# Patient Record
Sex: FEMALE | HISPANIC OR LATINO | Employment: UNEMPLOYED | URBAN - METROPOLITAN AREA
[De-identification: names, ages, dates, MRNs, and addresses within clinical notes are randomized per-mention and may not be internally consistent; named-entity substitution may affect disease eponyms.]

---

## 2019-12-27 ENCOUNTER — HOSPITAL ENCOUNTER (EMERGENCY)
Facility: MEDICAL CENTER | Age: 44
End: 2019-12-27

## 2023-01-04 ENCOUNTER — OFFICE VISIT (OUTPATIENT)
Dept: MEDICAL GROUP | Facility: CLINIC | Age: 48
End: 2023-01-04
Payer: MEDICAID

## 2023-01-04 VITALS — WEIGHT: 163 LBS | HEIGHT: 64 IN | BODY MASS INDEX: 27.83 KG/M2

## 2023-01-04 DIAGNOSIS — Z13.79 GENETIC SCREENING: ICD-10-CM

## 2023-01-04 DIAGNOSIS — Z76.89 ENCOUNTER TO ESTABLISH CARE: ICD-10-CM

## 2023-01-04 DIAGNOSIS — Z83.3 FAMILY HISTORY OF DIABETES MELLITUS: ICD-10-CM

## 2023-01-04 DIAGNOSIS — N28.9 KIDNEY DISEASE: ICD-10-CM

## 2023-01-04 PROCEDURE — 99203 OFFICE O/P NEW LOW 30 MIN: CPT | Performed by: STUDENT IN AN ORGANIZED HEALTH CARE EDUCATION/TRAINING PROGRAM

## 2023-01-04 ASSESSMENT — ENCOUNTER SYMPTOMS
ABDOMINAL PAIN: 0
SHORTNESS OF BREATH: 0
SENSORY CHANGE: 0
HEADACHES: 0
NAUSEA: 0
COUGH: 0
BLURRED VISION: 0
BACK PAIN: 0
MYALGIAS: 0
SORE THROAT: 0
FOCAL WEAKNESS: 0
SPEECH CHANGE: 0
FEVER: 0
VOMITING: 0
CHILLS: 0

## 2023-01-04 NOTE — PROGRESS NOTES
Subjective:     CC:  Establish care    Diagnoses of Encounter to establish care, Family history of diabetes mellitus, BMI 27.0-27.9,adult, Genetic screening, and Kidney disease were pertinent to this visit.    HISTORY OF THE PRESENT ILLNESS: Patient is a 47 y.o. female. This pleasant patient is here today to establish care.     Problem   Encounter to Establish Care    Patient is a pleasant 47-year-old Amharic-speaking female who presents to establish care.  She has not seen a healthcare provider for about 5 years.  She denies any known chronic medical problems and has no complaints or concerns today.       Family History of Diabetes Mellitus    Family history of diabetes in father and sister.  Patient has not recently been screened for diabetes.     Bmi 27.0-27.9,Adult   Genetic Screening    Eligible for [facility] study.     Kidney Disease (Resolved)    H/o kidney infection       Labor and Delivery Indication for Care Or Intervention (Resolved)   Encounter for Supervision of Other Normal Pregnancy (Resolved)    Diagnois update 10/1/2016     [place] (Advanced Maternal Age) Multigravida 35+ (Resolved)     OB/GYN History:  Menses: regular, monthly, no concerns  OB history: 6 vaginal deliveries, denies complications during pregnancies or deliveries  Last Pap smear: Uncertain, several years ago. Denies abnormal pap smears in past    Social History:  Denies Tobacco, ETOH, or drug use  Lives with  and four of their children.  Not currently sexually active    ROS:   Review of Systems   Constitutional:  Negative for chills and fever.   HENT:  Negative for congestion and sore throat.    Eyes:  Negative for blurred vision.   Respiratory:  Negative for cough and shortness of breath.    Cardiovascular:  Negative for chest pain and leg swelling.   Gastrointestinal:  Negative for abdominal pain, nausea and vomiting.   Musculoskeletal:  Negative for back pain, joint pain and myalgias.   Skin:  Negative for rash.   Neurological:   "Negative for sensory change, speech change, focal weakness and headaches.   Endo/Heme/Allergies:  Positive for environmental allergies.       Objective:       Exam: Ht 1.626 m (5' 4\")   Wt 73.9 kg (163 lb)  Body mass index is 27.98 kg/m².    Physical Exam  Vitals reviewed.   Constitutional:       General: She is not in acute distress.     Appearance: Normal appearance.   HENT:      Head: Normocephalic and atraumatic.      Right Ear: External ear normal.      Left Ear: External ear normal.      Nose: Nose normal. No congestion.      Mouth/Throat:      Mouth: Mucous membranes are moist.      Pharynx: Oropharynx is clear. No oropharyngeal exudate.   Eyes:      Conjunctiva/sclera: Conjunctivae normal.      Pupils: Pupils are equal, round, and reactive to light.   Cardiovascular:      Rate and Rhythm: Normal rate and regular rhythm.      Pulses: Normal pulses.      Heart sounds: Normal heart sounds. No murmur heard.  Pulmonary:      Effort: Pulmonary effort is normal. No respiratory distress.      Breath sounds: Normal breath sounds. No stridor. No wheezing, rhonchi or rales.   Musculoskeletal:         General: No swelling or deformity.      Cervical back: Neck supple. No tenderness.   Lymphadenopathy:      Cervical: No cervical adenopathy.   Skin:     General: Skin is warm and dry.      Capillary Refill: Capillary refill takes less than 2 seconds.      Findings: No rash.   Neurological:      General: No focal deficit present.      Mental Status: She is alert.      Motor: No weakness.      Gait: Gait normal.   Psychiatric:         Mood and Affect: Mood normal.         Behavior: Behavior normal.         Thought Content: Thought content normal.         Judgment: Judgment normal.           Assessment & Plan:   47 y.o. female with the following -    Problem List Items Addressed This Visit       Encounter to establish care     Follow-up for Pap smear and well woman exam at next available appointment.         Family history " of diabetes mellitus     Hemoglobin A1c ordered today         Relevant Orders    HEMOGLOBIN A1C    BMI 27.0-27.9,adult     Hemoglobin A1c, CMP, and lipid panel ordered today.         Relevant Orders    Comp Metabolic Panel    HEMOGLOBIN A1C    Lipid Profile    Genetic screening     Ordered CBC and CMP and referred to [facility] for enrollment.         Relevant Orders    CBC WITHOUT DIFFERENTIAL    RESOLVED: Kidney disease    Relevant Orders    Referral to Genetic Research Studies           No follow-ups on file.      Schedule follow up for WWE at next available appointment

## 2024-04-30 ENCOUNTER — HOSPITAL ENCOUNTER (OUTPATIENT)
Facility: MEDICAL CENTER | Age: 49
End: 2024-04-30
Attending: STUDENT IN AN ORGANIZED HEALTH CARE EDUCATION/TRAINING PROGRAM
Payer: MEDICAID

## 2024-04-30 ENCOUNTER — OFFICE VISIT (OUTPATIENT)
Dept: MEDICAL GROUP | Facility: CLINIC | Age: 49
End: 2024-04-30
Payer: MEDICAID

## 2024-04-30 VITALS
OXYGEN SATURATION: 94 % | WEIGHT: 164 LBS | DIASTOLIC BLOOD PRESSURE: 80 MMHG | BODY MASS INDEX: 28 KG/M2 | HEART RATE: 77 BPM | RESPIRATION RATE: 17 BRPM | HEIGHT: 64 IN | SYSTOLIC BLOOD PRESSURE: 119 MMHG

## 2024-04-30 DIAGNOSIS — Z12.12 SCREENING FOR COLORECTAL CANCER: ICD-10-CM

## 2024-04-30 DIAGNOSIS — R00.2 PALPITATIONS: ICD-10-CM

## 2024-04-30 DIAGNOSIS — Z12.4 PAP SMEAR FOR CERVICAL CANCER SCREENING: ICD-10-CM

## 2024-04-30 DIAGNOSIS — Z12.11 SCREENING FOR COLORECTAL CANCER: ICD-10-CM

## 2024-04-30 DIAGNOSIS — E66.9 CLASS 2 OBESITY: ICD-10-CM

## 2024-04-30 DIAGNOSIS — Z12.31 ENCOUNTER FOR SCREENING MAMMOGRAM FOR BREAST CANCER: ICD-10-CM

## 2024-04-30 PROBLEM — E66.812 CLASS 2 OBESITY: Status: ACTIVE | Noted: 2024-04-30

## 2024-04-30 NOTE — PROGRESS NOTES
"Subjective:     CC: Establish care, Pap smear    HPI:   Katarzyna presents today with     Problem   Encounter for Screening Mammogram for Breast Cancer    The patient has never undergone a mammogram     Screening for Colorectal Cancer    The patient has never undergone a colonoscopy     Class 2 Obesity    Patient has a BMI of 28     Palpitations    Approximately once a month, the patient will have palpitations.  She cannot identify any sources contributing to her sensation.  She says that they will last for 10 to 15 minutes and resolve without intervention.     Pap Smear for Cervical Cancer Screening    Patient has not had a Pap smear in approximately 8 years.         No current [facility]-ordered outpatient medications on file.     No current [facility]-ordered facility-administered medications on file.       ROS negative unless stated in HPI.    Objective:     Exam:  /80 (BP Location: Right arm, Patient Position: Sitting, BP Cuff Size: Adult)   Pulse 77   Resp 17   Ht 1.626 m (5' 4\")   Wt 74.4 kg (164 lb)   SpO2 94%   BMI 28.15 kg/m²  Body mass index is 28.15 kg/m².    Physical Exam  Constitutional:       Appearance: Normal appearance.   Pulmonary:      Effort: Pulmonary effort is normal. No respiratory distress.      Breath sounds: Normal breath sounds.   Genitourinary:     General: Normal vulva.      Vagina: Normal.      Cervix: Normal.      Uterus: Normal.    Neurological:      General: No focal deficit present.      Mental Status: She is alert and oriented to person, place, and time.   Psychiatric:         Behavior: Behavior normal.         Labs:   Results for orders placed or performed during the hospital encounter of 10/04/12   CBC WITH DIFFERENTIAL   Result Value Ref Range    WBC 8.6 4.8 - 10.8 K/uL    RBC 3.65 (L) 4.20 - 5.40 M/uL    Hemoglobin 11.1 (L) 12.0 - 16.0 g/dL    Hematocrit 32.7 (L) 37.0 - 47.0 %    MCV 89.5 77.0 - 97.0 fL    MCH 30.3 27.0 - 33.0 pg    MCHC 33.8 30.0 - 35.0 g/dL    RDW 16.8 (H) " 12.0 - 16.2 %    Platelet Count 291 164 - 446 K/uL    MPV 7.9 6.7 - 10.4 fL    Neutrophils-Polys 73.0 (H) 44.0 - 72.0 %    Lymphocytes 18.3 (L) 22.0 - 41.0 %    Monocytes 7.1 1.0 - 9.0 %    Eosinophils 1.3 0.0 - 6.0 %    Basophils 0.3 0.0 - 2.0 %    Neutrophils (Absolute) 6.3 1.8 - 7.7 K/uL    Lymphs (Absolute) 1.6 1.0 - 4.8 K/uL   HOLD BB SPECIMEN (NOT TESTED)   Result Value Ref Range    Holding Tube - Bb DONE    RBC MORPHOLOGY   Result Value Ref Range    RBC Morphology Normal    CBC WITHOUT DIFFERENTIAL   Result Value Ref Range    WBC 10.7 4.8 - 10.8 K/uL    RBC 3.20 (L) 4.20 - 5.40 M/uL    Hemoglobin 9.7 (L) 12.0 - 16.0 g/dL    Hematocrit 29.0 (L) 37.0 - 47.0 %    MCV 90.6 77.0 - 97.0 fL    MCH 30.3 27.0 - 33.0 pg    MCHC 33.4 30.0 - 35.0 g/dL    RDW 16.4 (H) 12.0 - 16.2 %    Platelet Count 249 164 - 446 K/uL    MPV 7.9 6.7 - 10.4 fL       Assessment & Plan:     48 y.o. female with the following -     Palpitations  CBC, TSH with reflex T4, remote cardiac monitor (Zio patch) ordered.  Will review upon return.    Screening for colorectal cancer  Colonoscopy ordered    Pap smear for cervical cancer screening  Pap smear completed today with HPV cotesting    Encounter for screening mammogram for breast cancer  Mammogram ordered    Class 2 obesity  CBC, CMP, A1c, lipid profile, TSH with reflex T4 ordered.  Will review upon return.     Return if symptoms worsen or fail to improve.

## 2024-05-01 ENCOUNTER — HOSPITAL ENCOUNTER (OUTPATIENT)
Dept: LAB | Facility: MEDICAL CENTER | Age: 49
End: 2024-05-01
Attending: STUDENT IN AN ORGANIZED HEALTH CARE EDUCATION/TRAINING PROGRAM
Payer: MEDICAID

## 2024-05-01 DIAGNOSIS — E66.9 CLASS 2 OBESITY: ICD-10-CM

## 2024-05-01 LAB
ALBUMIN SERPL BCP-MCNC: 4.1 G/DL (ref 3.2–4.9)
ALBUMIN/GLOB SERPL: 1.3 G/DL
ALP SERPL-CCNC: 101 U/L (ref 30–99)
ALT SERPL-CCNC: 23 U/L (ref 2–50)
ANION GAP SERPL CALC-SCNC: 10 MMOL/L (ref 7–16)
AST SERPL-CCNC: 20 U/L (ref 12–45)
BASOPHILS # BLD AUTO: 0.8 % (ref 0–1.8)
BASOPHILS # BLD: 0.05 K/UL (ref 0–0.12)
BILIRUB SERPL-MCNC: <0.2 MG/DL (ref 0.1–1.5)
BUN SERPL-MCNC: 17 MG/DL (ref 8–22)
CALCIUM ALBUM COR SERPL-MCNC: 9.5 MG/DL (ref 8.5–10.5)
CALCIUM SERPL-MCNC: 9.6 MG/DL (ref 8.5–10.5)
CHLORIDE SERPL-SCNC: 108 MMOL/L (ref 96–112)
CHOLEST SERPL-MCNC: 148 MG/DL (ref 100–199)
CO2 SERPL-SCNC: 24 MMOL/L (ref 20–33)
CREAT SERPL-MCNC: 0.54 MG/DL (ref 0.5–1.4)
EOSINOPHIL # BLD AUTO: 0.24 K/UL (ref 0–0.51)
EOSINOPHIL NFR BLD: 3.6 % (ref 0–6.9)
ERYTHROCYTE [DISTWIDTH] IN BLOOD BY AUTOMATED COUNT: 50.4 FL (ref 35.9–50)
EST. AVERAGE GLUCOSE BLD GHB EST-MCNC: 128 MG/DL
GFR SERPLBLD CREATININE-BSD FMLA CKD-EPI: 112 ML/MIN/1.73 M 2
GLOBULIN SER CALC-MCNC: 3.1 G/DL (ref 1.9–3.5)
GLUCOSE SERPL-MCNC: 104 MG/DL (ref 65–99)
HBA1C MFR BLD: 6.1 % (ref 4–5.6)
HCT VFR BLD AUTO: 37.3 % (ref 37–47)
HDLC SERPL-MCNC: 59 MG/DL
HGB BLD-MCNC: 12 G/DL (ref 12–16)
IMM GRANULOCYTES # BLD AUTO: 0.02 K/UL (ref 0–0.11)
IMM GRANULOCYTES NFR BLD AUTO: 0.3 % (ref 0–0.9)
LDLC SERPL CALC-MCNC: 75 MG/DL
LYMPHOCYTES # BLD AUTO: 2.43 K/UL (ref 1–4.8)
LYMPHOCYTES NFR BLD: 36.8 % (ref 22–41)
MCH RBC QN AUTO: 27.8 PG (ref 27–33)
MCHC RBC AUTO-ENTMCNC: 32.2 G/DL (ref 32.2–35.5)
MCV RBC AUTO: 86.3 FL (ref 81.4–97.8)
MONOCYTES # BLD AUTO: 0.57 K/UL (ref 0–0.85)
MONOCYTES NFR BLD AUTO: 8.6 % (ref 0–13.4)
NEUTROPHILS # BLD AUTO: 3.3 K/UL (ref 1.82–7.42)
NEUTROPHILS NFR BLD: 49.9 % (ref 44–72)
NRBC # BLD AUTO: 0 K/UL
NRBC BLD-RTO: 0 /100 WBC (ref 0–0.2)
PLATELET # BLD AUTO: 403 K/UL (ref 164–446)
PMV BLD AUTO: 9.3 FL (ref 9–12.9)
POTASSIUM SERPL-SCNC: 4.2 MMOL/L (ref 3.6–5.5)
PROT SERPL-MCNC: 7.2 G/DL (ref 6–8.2)
RBC # BLD AUTO: 4.32 M/UL (ref 4.2–5.4)
SODIUM SERPL-SCNC: 142 MMOL/L (ref 135–145)
TRIGL SERPL-MCNC: 71 MG/DL (ref 0–149)
TSH SERPL DL<=0.005 MIU/L-ACNC: 2.83 UIU/ML (ref 0.38–5.33)
WBC # BLD AUTO: 6.6 K/UL (ref 4.8–10.8)

## 2024-05-02 ENCOUNTER — HOSPITAL ENCOUNTER (OUTPATIENT)
Dept: RADIOLOGY | Facility: MEDICAL CENTER | Age: 49
End: 2024-05-02
Attending: STUDENT IN AN ORGANIZED HEALTH CARE EDUCATION/TRAINING PROGRAM
Payer: MEDICAID

## 2024-05-02 DIAGNOSIS — Z12.31 ENCOUNTER FOR SCREENING MAMMOGRAM FOR BREAST CANCER: ICD-10-CM

## 2024-05-02 LAB — AMBIGUOUS DTTM AMBI4: NORMAL

## 2024-05-06 LAB
C TRACH RRNA CVX QL NAA+PROBE: NEGATIVE
CYTOLOGIST CVX/VAG CYTO: NORMAL
CYTOLOGY CVX/VAG DOC CYTO: NORMAL
CYTOLOGY CVX/VAG DOC THIN PREP: NORMAL
HPV I/H RISK 4 DNA CVX QL PROBE+SIG AMP: NEGATIVE
N GONORRHOEA RRNA CVX QL NAA+PROBE: NEGATIVE
NOTE NL11727A: NORMAL
OTHER STN SPEC: NORMAL
STAT OF ADQ CVX/VAG CYTO-IMP: NORMAL

## 2024-05-07 DIAGNOSIS — N63.23 MASS OF LOWER OUTER QUADRANT OF LEFT BREAST: ICD-10-CM

## 2024-05-07 DIAGNOSIS — N63.20 MASS OF LEFT BREAST, UNSPECIFIED QUADRANT: ICD-10-CM

## 2024-05-08 ENCOUNTER — TELEPHONE (OUTPATIENT)
Dept: MEDICAL GROUP | Facility: CLINIC | Age: 49
End: 2024-05-08
Payer: MEDICAID

## 2024-05-08 NOTE — TELEPHONE ENCOUNTER
Phone Number Called: [phone] (home)       Call outcome: Spoke to patient regarding message below.    Message: I have called the patient, her  answer the call and I was informing him that we need to make an appointment for  her wife to go over the results of the labs per provider request, he the  informed me that he came in today to schedule and appointment and was informed that they couldn't make him an appointment. At all he is not happy with our office and will not be coming her any more as the  are rude.

## 2024-05-08 NOTE — TELEPHONE ENCOUNTER
----- Message from Daily West M.D. sent at 5/7/2024  9:02 AM PDT -----  Please have this to make an appointment to discuss the results of her labs.

## 2024-05-13 ENCOUNTER — TELEPHONE (OUTPATIENT)
Dept: MEDICAL GROUP | Facility: CLINIC | Age: 49
End: 2024-05-13
Payer: MEDICAID

## 2024-05-13 NOTE — TELEPHONE ENCOUNTER
Phone Number Called: [phone] (home)      Call outcome: Left detailed message for patient. Informed to call back with any additional questions.    Message: LM to call and schedule appointment to discuss Blood work.

## 2024-05-13 NOTE — TELEPHONE ENCOUNTER
----- Message from César Quiroz, Med Ass't sent at 5/9/2024  9:46 AM PDT -----  Danish speaking pt, would one of you mind giving her call when you have a moment. Thank you!  ----- Message -----  From: Daily West M.D.  Sent: 5/7/2024   9:02 AM PDT  To: Kell Tello Ma    Please have this to make an appointment to discuss the results of her labs.

## 2024-05-16 ENCOUNTER — HOSPITAL ENCOUNTER (OUTPATIENT)
Dept: RADIOLOGY | Facility: MEDICAL CENTER | Age: 49
End: 2024-05-16
Attending: STUDENT IN AN ORGANIZED HEALTH CARE EDUCATION/TRAINING PROGRAM
Payer: MEDICAID

## 2024-05-16 DIAGNOSIS — R92.8 ABNORMAL MAMMOGRAM: ICD-10-CM

## 2024-05-17 ENCOUNTER — HOSPITAL ENCOUNTER (OUTPATIENT)
Dept: RADIOLOGY | Facility: MEDICAL CENTER | Age: 49
End: 2024-05-17
Attending: STUDENT IN AN ORGANIZED HEALTH CARE EDUCATION/TRAINING PROGRAM
Payer: MEDICAID

## 2024-06-14 ENCOUNTER — OFFICE VISIT (OUTPATIENT)
Dept: MEDICAL GROUP | Facility: CLINIC | Age: 49
End: 2024-06-14
Payer: MEDICAID

## 2024-06-14 VITALS
HEIGHT: 60 IN | OXYGEN SATURATION: 96 % | DIASTOLIC BLOOD PRESSURE: 75 MMHG | HEART RATE: 72 BPM | RESPIRATION RATE: 16 BRPM | SYSTOLIC BLOOD PRESSURE: 121 MMHG | BODY MASS INDEX: 32.2 KG/M2 | TEMPERATURE: 97.1 F | WEIGHT: 164 LBS

## 2024-06-14 DIAGNOSIS — R73.03 PREDIABETES: ICD-10-CM

## 2024-06-14 DIAGNOSIS — Z76.89 ENCOUNTER TO ESTABLISH CARE: ICD-10-CM

## 2024-06-14 PROCEDURE — 99213 OFFICE O/P EST LOW 20 MIN: CPT | Mod: GE | Performed by: STUDENT IN AN ORGANIZED HEALTH CARE EDUCATION/TRAINING PROGRAM

## 2024-06-14 ASSESSMENT — PATIENT HEALTH QUESTIONNAIRE - PHQ9: CLINICAL INTERPRETATION OF PHQ2 SCORE: 0

## 2024-06-14 ASSESSMENT — FIBROSIS 4 INDEX: FIB4 SCORE: 0.52

## 2024-06-14 NOTE — PROGRESS NOTES
Subjective:     CC: Follow up labs    HPI:   Katarzyna presents today with    Problem   Prediabetes    6/14/2024  Patient A1c 6.1.  Patient reports that she does not eat meat.  She does not have an established exercise plan at this time.     Encounter to Establish Care    1/4/2023  Patient is a pleasant 47-year-old Setswana-speaking female who presents to establish care.  She has not seen a healthcare provider for about 5 years.  She denies any known chronic medical problems and has no complaints or concerns today.    6/14/2024  Patient requesting the results of her laboratory studies.           No current Epic-ordered outpatient medications on file.     No current Epic-ordered facility-administered medications on file.     ROS negative unless stated in HPI.    Objective:     Exam:  /75   Pulse 72   Temp 36.2 °C (97.1 °F) (Temporal)   Resp 16   Ht 1.524 m (5')   Wt 74.4 kg (164 lb)   SpO2 96%   BMI 32.03 kg/m²  Body mass index is 32.03 kg/m².    Physical Exam  Constitutional:       Appearance: Normal appearance.   Pulmonary:      Effort: Pulmonary effort is normal. No respiratory distress.   Neurological:      General: No focal deficit present.      Mental Status: She is alert and oriented to person, place, and time.   Psychiatric:         Behavior: Behavior normal.       Labs:   Results for orders placed or performed during the hospital encounter of 05/01/24   Lipid Profile   Result Value Ref Range    Cholesterol,Tot 148 100 - 199 mg/dL    Triglycerides 71 0 - 149 mg/dL    HDL 59 >=40 mg/dL    LDL 75 <100 mg/dL   TSH WITH REFLEX TO FT4   Result Value Ref Range    TSH 2.830 0.380 - 5.330 uIU/mL   HEMOGLOBIN A1C   Result Value Ref Range    Glycohemoglobin 6.1 (H) 4.0 - 5.6 %    Est Avg Glucose 128 mg/dL   Comp Metabolic Panel   Result Value Ref Range    Sodium 142 135 - 145 mmol/L    Potassium 4.2 3.6 - 5.5 mmol/L    Chloride 108 96 - 112 mmol/L    Co2 24 20 - 33 mmol/L    Anion Gap 10.0 7.0 - 16.0     Glucose 104 (H) 65 - 99 mg/dL    Bun 17 8 - 22 mg/dL    Creatinine 0.54 0.50 - 1.40 mg/dL    Calcium 9.6 8.5 - 10.5 mg/dL    Correct Calcium 9.5 8.5 - 10.5 mg/dL    AST(SGOT) 20 12 - 45 U/L    ALT(SGPT) 23 2 - 50 U/L    Alkaline Phosphatase 101 (H) 30 - 99 U/L    Total Bilirubin <0.2 0.1 - 1.5 mg/dL    Albumin 4.1 3.2 - 4.9 g/dL    Total Protein 7.2 6.0 - 8.2 g/dL    Globulin 3.1 1.9 - 3.5 g/dL    A-G Ratio 1.3 g/dL   CBC WITH DIFFERENTIAL   Result Value Ref Range    WBC 6.6 4.8 - 10.8 K/uL    RBC 4.32 4.20 - 5.40 M/uL    Hemoglobin 12.0 12.0 - 16.0 g/dL    Hematocrit 37.3 37.0 - 47.0 %    MCV 86.3 81.4 - 97.8 fL    MCH 27.8 27.0 - 33.0 pg    MCHC 32.2 32.2 - 35.5 g/dL    RDW 50.4 (H) 35.9 - 50.0 fL    Platelet Count 403 164 - 446 K/uL    MPV 9.3 9.0 - 12.9 fL    Neutrophils-Polys 49.90 44.00 - 72.00 %    Lymphocytes 36.80 22.00 - 41.00 %    Monocytes 8.60 0.00 - 13.40 %    Eosinophils 3.60 0.00 - 6.90 %    Basophils 0.80 0.00 - 1.80 %    Immature Granulocytes 0.30 0.00 - 0.90 %    Nucleated RBC 0.00 0.00 - 0.20 /100 WBC    Neutrophils (Absolute) 3.30 1.82 - 7.42 K/uL    Lymphs (Absolute) 2.43 1.00 - 4.80 K/uL    Monos (Absolute) 0.57 0.00 - 0.85 K/uL    Eos (Absolute) 0.24 0.00 - 0.51 K/uL    Baso (Absolute) 0.05 0.00 - 0.12 K/uL    Immature Granulocytes (abs) 0.02 0.00 - 0.11 K/uL    NRBC (Absolute) 0.00 K/uL   ESTIMATED GFR   Result Value Ref Range    GFR (CKD-EPI) 112 >60 mL/min/1.73 m 2       Assessment & Plan:     48 y.o. female with the following -     Prediabetes  Lifestyle modifications discussed.  Patient elected to attempt lifestyle modifications prior to starting metformin.  Patient to return in approximately 3 months time to reassess A1c.    Encounter to establish care  Labs completed 5/1/2024 reviewed and discussed with the patient.  She expressed understanding.  Patient expressed understanding of her mammogram results.  Patient expressed understanding of her pap smear results.         Return if  symptoms worsen or fail to improve.

## 2024-06-14 NOTE — ASSESSMENT & PLAN NOTE
Lifestyle modifications discussed.  Patient elected to attempt lifestyle modifications prior to starting metformin.  Patient to return in approximately 3 months time to reassess A1c.

## 2024-06-14 NOTE — ASSESSMENT & PLAN NOTE
Labs completed 5/1/2024 reviewed and discussed with the patient.  She expressed understanding.  Patient expressed understanding of her mammogram results.  Patient expressed understanding of her pap smear results.

## 2024-06-14 NOTE — PROGRESS NOTES
This patient's case was reviewed and discussed with the resident immediately after the patient visit. I agree with the resident's assessment and plan that we developed, as outlined in the note. Report electronically signed by:    Herber Heredia MD   [facility]  [place]

## 2024-08-07 ENCOUNTER — PHARMACY VISIT (OUTPATIENT)
Dept: PHARMACY | Facility: MEDICAL CENTER | Age: 49
End: 2024-08-07
Payer: COMMERCIAL

## 2024-08-07 PROCEDURE — RXMED WILLOW AMBULATORY MEDICATION CHARGE: Performed by: DENTIST

## 2024-08-07 RX ORDER — CHLORHEXIDINE GLUCONATE ORAL RINSE 1.2 MG/ML
15 SOLUTION DENTAL 2 TIMES DAILY
Qty: 473 ML | Refills: 0 | OUTPATIENT
Start: 2024-08-07

## 2024-08-07 RX ORDER — IBUPROFEN 600 MG/1
600 TABLET, FILM COATED ORAL EVERY 6 HOURS PRN
Qty: 12 TABLET | Refills: 0 | OUTPATIENT
Start: 2024-08-07

## 2024-08-07 RX ORDER — HYDROCODONE BITARTRATE AND ACETAMINOPHEN 5; 325 MG/1; MG/1
1 TABLET ORAL EVERY 4 HOURS PRN
Qty: 8 TABLET | Refills: 0 | OUTPATIENT
Start: 2024-08-07 | End: 2024-08-09

## 2025-05-30 PROCEDURE — RXMED WILLOW AMBULATORY MEDICATION CHARGE

## 2025-06-02 ENCOUNTER — PHARMACY VISIT (OUTPATIENT)
Dept: PHARMACY | Facility: MEDICAL CENTER | Age: 50
End: 2025-06-02
Payer: COMMERCIAL